# Patient Record
Sex: MALE | Race: WHITE | NOT HISPANIC OR LATINO | Employment: FULL TIME | URBAN - METROPOLITAN AREA
[De-identification: names, ages, dates, MRNs, and addresses within clinical notes are randomized per-mention and may not be internally consistent; named-entity substitution may affect disease eponyms.]

---

## 2017-10-21 ENCOUNTER — HOSPITAL ENCOUNTER (EMERGENCY)
Facility: HOSPITAL | Age: 32
Discharge: HOME | End: 2017-10-21
Attending: EMERGENCY MEDICINE | Admitting: EMERGENCY MEDICINE
Payer: COMMERCIAL

## 2017-10-21 VITALS
SYSTOLIC BLOOD PRESSURE: 131 MMHG | OXYGEN SATURATION: 99 % | BODY MASS INDEX: 24.93 KG/M2 | HEART RATE: 69 BPM | WEIGHT: 194.22 LBS | DIASTOLIC BLOOD PRESSURE: 74 MMHG | RESPIRATION RATE: 18 BRPM | TEMPERATURE: 98.1 F | HEIGHT: 74 IN
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
## 2017-10-21 PROCEDURE — 99282 EMERGENCY DEPT VISIT SF MDM: CPT

## 2017-10-21 RX ORDER — LIDOCAINE HYDROCHLORIDE 10 MG/ML
INJECTION, SOLUTION EPIDURAL; INFILTRATION; INTRACAUDAL; PERINEURAL
Status: DISCONTINUED
Start: 2017-10-21 | End: 2017-10-21 | Stop reason: HOSPADM

## 2017-10-21 RX ORDER — DOXYCYCLINE HYCLATE 100 MG/1
200 CAPSULE ORAL ONCE
Status: COMPLETED | OUTPATIENT
Start: 2017-10-21 | End: 2017-10-21

## 2017-10-21 RX ORDER — LIDOCAINE HYDROCHLORIDE 10 MG/ML
5 INJECTION, SOLUTION INFILTRATION; PERINEURAL ONCE
Status: COMPLETED | OUTPATIENT
Start: 2017-10-21 | End: 2017-10-21

## 2017-10-21 RX ADMIN — LIDOCAINE HYDROCHLORIDE 5 ML: 10 INJECTION, SOLUTION EPIDURAL; INFILTRATION; INTRACAUDAL; PERINEURAL at 08:40

## 2017-10-21 RX ADMIN — DOXYCYCLINE 200 MG: 100 CAPSULE ORAL at 08:39

## 2017-10-21 NOTE — ED PROVIDER NOTES
History  Chief Complaint   Patient presents with    Tick Bite     Reports pulling out tick around 6 am from [facility]  Reports head still remaining  Patient is a pleasant 68-year-old male who got bit by a tick likely yesterday after he went hiking  He removed the tick this morning but the head was still attached, removed the head with some lidocaine and a 11 blade scalpel  Patient tolerated the procedure well  No fevers, chills, sweats, nausea, vomiting, diarrhea  Medical decision making:  Well-appearing 68-year-old male, tick head removed, will send home  None       History reviewed  No pertinent past medical history  History reviewed  No pertinent surgical history  History reviewed  No pertinent family history  I have reviewed and agree with the history as documented  Social History   Substance Use Topics    Smoking status: Never Smoker    Smokeless tobacco: Never Used    Alcohol use No        Review of Systems   Constitutional: Negative for chills and fever  HENT: Negative for ear pain and hearing loss  Respiratory: Negative for chest tightness and shortness of breath  Cardiovascular: Negative for chest pain and leg swelling  Gastrointestinal: Negative for abdominal pain, diarrhea and nausea  Genitourinary: Negative for dysuria and hematuria  Musculoskeletal: Negative for joint swelling and neck stiffness  Skin: Negative for rash  Neurological: Negative for seizures and headaches  Psychiatric/Behavioral: Negative for hallucinations and suicidal ideas  All other systems reviewed and are negative        Physical Exam  ED Triage Vitals [10/21/17 0810]   Temperature Pulse Respirations Blood Pressure SpO2   98 1 °F (36 7 °C) 69 18 131/74 99 %      Temp Source Heart Rate Source Patient Position - Orthostatic VS BP Location FiO2 (%)   Oral Monitor Sitting Right arm --      Pain Score       No Pain           Physical Exam   Constitutional: He is oriented to person, place, and time  He appears well-developed and well-nourished  HENT:   Head: Normocephalic and atraumatic  Eyes: EOM are normal  Pupils are equal, round, and reactive to light  Neck: Normal range of motion  Neck supple  Cardiovascular: Normal rate, regular rhythm and normal heart sounds  No murmur heard  Pulmonary/Chest: Effort normal and breath sounds normal  No respiratory distress  He has no wheezes  Abdominal: Soft  Bowel sounds are normal  He exhibits no distension  There is no tenderness  Musculoskeletal: Normal range of motion  He exhibits no edema or tenderness  Neurological: He is alert and oriented to person, place, and time  No cranial nerve deficit  Coordination normal    Skin: Skin is warm and dry  He is not diaphoretic  No erythema  Psychiatric: He has a normal mood and affect  His behavior is normal    Nursing note and vitals reviewed  ED Medications  Medications   doxycycline hyclate (VIBRAMYCIN) capsule 200 mg (200 mg Oral Given 10/21/17 0839)   lidocaine (XYLOCAINE) 1 % injection 5 mL (5 mL Infiltration Given 10/21/17 0840)       Diagnostic Studies  Labs Reviewed - No data to display    No orders to display       Procedures  Procedures      Phone Contacts  ED Phone Contact    ED Course  ED Course                                MDM  CritCare Time    Disposition  Final diagnoses:   Tick bite     ED Disposition     ED Disposition Condition Comment    Discharge  Sherry Lilian discharge to home/self care  Condition at discharge: Good        Follow-up Information     Follow up With Specialties Details Why Contact Info    Rosalba Suh MD Family Medicine In 1 day  [address] [place] [address] [id number]          Patient's Medications    No medications on file     No discharge procedures on file      ED Provider  Electronically Signed by       Juan David Palmer MD  10/21/17 4321

## 2017-10-21 NOTE — DISCHARGE INSTRUCTIONS
Tick Bite   WHAT YOU NEED TO KNOW:   Most tick bites are not dangerous, but ticks can pass disease or infection when they bite  A tick will bite and then move further into the skin, where it stays to feed on blood  Ticks need to be removed quickly  Serious symptoms of a tick bite need immediate treatment  DISCHARGE INSTRUCTIONS:   Medicines:   · Antibiotics:  Healthcare providers may give you antibiotics if you get an infection from the tick bite  Do not stop taking the antibiotics until you talk to your healthcare provider, even if you feel better  · Antihistamines:  Antihistamines decrease swelling and itching  · Local anesthetic:  This medicine helps to decrease pain and itching  · Skin protectant:  Skin protectants help soothe itchy, red skin  They may also keep out infection  Some examples of these medicines are calamine and zinc oxide  · Steroids: You may need to take steroids if you have a very bad reaction to your tick bite  Take steroids with food to keep your stomach from becoming upset from the medicine  Do not stop taking this medicine until you talk to your healthcare provider  · Topical steroids:  A topical steroid is medicine that you rub into your skin to decrease redness and itching  Topical steroids are available without a doctor's order  Do not use this medicine on areas of skin that are cut, scratched, or infected  · Take your medicine as directed  Call your healthcare provider if you think your medicine is not helping or if you have side effects  Tell him if you are allergic to any medicine  Keep a list of the medicines, vitamins, and herbs you take  Include the amounts, and when and why you take them  Bring the list or the pill bottles to follow-up visits  Carry your medicine list with you in case of an emergency  Follow up with your healthcare provider as directed:  Write down your questions so you remember to ask them during your visits     How to remove a tick:  Ticks must be removed as soon as possible to help prevent them from passing disease or infection  You are less likely to get sick from a tick bite if you remove the tick within 24 hours  Do the following to remove a tick:  · Soak a cotton ball with rubbing alcohol, or use a disposable alcohol wipe  Gently clean the skin around the tick  · Grasp the tick as close to your skin as possible  Pull the tick straight up and out with tweezers, or with fingertips protected by a tissue or gloves  Do not touch the tick with your bare hands  · Pull gently until the tick lets go  Do not twist or jerk the tick suddenly, because this may break off the tick's head or mouth parts  You can buy a special V-shaped device that help lift ticks out safely  Do not leave any part of the tick in your skin  · Do not crush or squeeze the tick since its body may be infected with germs  Flush the tick down the toilet  · Do not put a hot match, petroleum jelly, or fingernail polish on the tick  This does not help and may be dangerous  · After the tick is removed, clean the area of the bite with rubbing alcohol  Then wash your hands with soap and water  Care for your tick bite:  Apply ice to the bite inocencio to help to decrease pain, itching and swelling  Put ice in a plastic bag  Cover the bag with a towel  Put the bag on your bite for 15 to 20 minutes every hour or as directed by your healthcare provider  Try not to scratch the bite  Prevent another tick bite:  Ticks live in areas covered by brush and grass  They may even be found in your lawn if you live in certain areas  Outdoor pets can carry ticks inside the house  Ticks can grab onto you or your clothes when you walk by grass or brush  If you go into areas that contain many trees, tall grasses, and underbrush, do the following:  · Wear protective clothing:  Wear pants and a long-sleeved shirt  Tuck your pants into your socks or boots  Tuck in your shirt   Wear sleeves that fit close to the skin at your wrists and neck  This will help prevent ticks from crawling through gaps in your clothing and onto your skin  Wear a hat in areas with trees  Wear light-colored clothing to make finding ticks easier  · Use insect repellant:  Put insect repellent on skin that is showing  The insect repellant should contain DEET  Do not put insect repellant on skin that is cut, scratched, or irritated  Do not put insect repellent on a child's face or hands  Always use soap and water to wash the insect repellant off as soon as possible once you are indoors  · Spray insect repellant onto your clothes:  Use permethrin spray  This spray kills ticks that crawl on your clothing  Be sure to spray the tops of your boots, bottom of pant legs, and sleeve cuffs  As soon as possible, wash and dry clothing that has been worn outdoors  · Check for ticks often:  Check your clothing, hair, and skin for ticks every 2 to 3 hours while you are outdoors  Carefully check the hairline, armpits, neck, and waist  Check your pets and children for ticks  Remove ticks from pets the same way as you remove them from people  · Decrease the risk of ticks in your yard:  Ticks like to live in [place], moist areas  Daralene Shar your lawn regularly to keep the grass short  Trim the grass around birdbaths and fences  Cut branches that are overgrown and take them out of the yard  Clear out leaf piles  Evalee Living firewood in a dry, kayla area  Contact your healthcare provider if:   · You cannot remove the tick  · The tick's head is stuck in the skin  · You have questions or concerns about your condition or care  Seek care immediately or call 911 if:   · You get a fever, rash, headache, or muscle or joint pains within 1 month of a tick bite  These may be signs of a more serious disease  · You are having trouble walking or moving your legs    © 2016 [address] is for End User's use only and may not be sold, redistributed or otherwise used for commercial purposes  All illustrations and images included in CareNotes® are the copyrighted property of [employer]  or [employer] Ron  The above information is an  only  It is not intended as medical advice for individual conditions or treatments  Talk to your doctor, nurse or pharmacist before following any medical regimen to see if it is safe and effective for you

## 2017-12-07 ENCOUNTER — HOSPITAL ENCOUNTER (OUTPATIENT)
Dept: RADIOLOGY | Facility: HOSPITAL | Age: 32
Discharge: HOME | End: 2017-12-07
Payer: COMMERCIAL

## 2017-12-07 ENCOUNTER — ALLSCRIPTS OFFICE VISIT (OUTPATIENT)
Dept: OTHER | Facility: OTHER | Age: 32
End: 2017-12-07

## 2017-12-07 DIAGNOSIS — J18.9 PNEUMONIA: ICD-10-CM

## 2017-12-07 PROCEDURE — 71020 HB CHEST X-RAY 2VW FRONTAL&LATL: CPT

## 2017-12-08 NOTE — PROGRESS NOTES
Assessment    1  CAP (community acquired pneumonia) (5) (J18 9)   2  Allergic rhinitis (477 9) (J30 9)   3  Cough due to bronchospasm (519 11) (J98 01)    Plan  CAP (community acquired pneumonia)    · Azithromycin 250 MG Oral Tablet; TAKE AS DIRECTED PER PACKAGEINSTRUCTIONS   · MethylPREDNISolone 4 MG Oral Tablet Therapy Pack; use as directed   · ProAir  (90 Base) MCG/ACT Inhalation Aerosol Solution; INHALE 2 PUFFSEVERY 4-6 HOURS AS NEEDED   · * XR CHEST PA & LATERAL; Status:Active; Requested for:[id number];     Discussion/Summary    In summary then this is a 50-year-old male whose had 5 weeks of persistent cough  He had a similar event 2 years ago  He has had no interval symptoms  He has no diagnosis of asthma  On examination today does have evidence of allergic rhinitis  He also appears to have focal findings of crackles/consolidation of his left posterior mid lung field  He also has significant bronchospasm  We are going to start him on azithromycin Z-Cedrick, a Medrol Dosepak and also given a albuterol inhaler to use  He is asked to go for chest x-ray to evaluate for pneumonia  In the meantime he is asked push fluids and rest and will follow up with him when results are available  He agrees  He is in no acute respiratory distress presently  Chief Complaint  I have had a cough for 5 weeks  Worse with exertion  Pleuritic exertion  Increased fatigue and night sweats  No sputum  Mild wheeze which seems to improve in the cold air  Coughs to  light headed   No diagnosis of asthma  No fever  Co workers ill with same as well as son  He was seen in urgent care center when it began and he had chest x-ray which she was told was normal       History of Present Illness  HPI: The patient is a 50-year-old male who states that he has had a cough for 5 weeks that while it seemed to improve  He has some anterior pleuritic chest pain  He has had increased fatigue and night sweats   He has had no sputum but he does admit to some mild shortness of breath which improves when he is out cold air  Sometimes he coughs only feels lightheaded  He has had no fever that he is aware of  Some of his coworkers and son have had upper respiratory infections  He carries no diagnosis of asthma  He has had no PND orthopnea exertional chest pain X cetera  Pneumonia, Community Acquired (Brief): The patient is being seen for an initial evaluation of community acquired pneumonia  Symptoms:  dry cough,-- dyspnea,-- chills,-- chest pain-- and-- fatigue, but-- no hemoptysis,-- no wheezing,-- no rapid respirations-- and-- no headache  The patient is currently experiencing symptoms  Associated symptoms:  nasal congestion-- and-- lightheadedness, but-- no rigors,-- no anorexia-- and-- no nausea  Review of Systems   Constitutional: no recent weight gain-- and-- no recent weight loss  ENT: nasal discharge  Cardiovascular: chest pain-- and-- Pleuritic anterior chest pain, but-- no lower extremity edema  Respiratory: shortness of breath-- and-- cough, but-- no orthopnea,-- no wheezing-- and-- no PND  Neurological: dizziness, but-- as noted in HPI-- and-- no headache  Family History  Father    1  Family history of Cancer (199 1) (C80 1)    Social History   · Never a smoker  The social history was reviewed and updated today  The social history was reviewed and is unchanged  Current Meds   1  No Reported Medications Recorded    Allergies  1  No Known Drug Allergies    Vitals   Recorded: [id number] 06:46PM   Temperature 36 2 F   Systolic 869   Diastolic 78   Weight 218 lb    BMI Calculated 25 51   BSA Calculated 2 14       Physical Exam   Constitutional  General appearance: No acute distress, well appearing and well nourished  -- Alert and oriented and in no apparent distress  Ears, Nose, Mouth, and Throat  Otoscopic examination: Tympanic membrance translucent with normal light reflex  Canals patent without erythema     Nasal mucosa, septum, and turbinates: Abnormal  -- Boggy and very pale blue  Oropharynx: Normal with no erythema, edema, exudate or lesions  -- No ulcer exudate or lesion  Pulmonary  Respiratory effort: Abnormal  -- Frequent bronchospastic cough  Auscultation of lungs: Abnormal  -- Mild to moderate expiratory delay with expiratory wheezing and he does have some left mid lower lung crackles  Cardiovascular  Auscultation of heart: Normal rate and rhythm, normal S1 and S2, without murmurs  -- Regular rhythm with a rate in the 80s  Examination of extremities for edema and/or varicosities: Normal  -- No edema  Lymphatic  Palpation of lymph nodes in neck: No lymphadenopathy  -- No JVD or adenopathy  Musculoskeletal  Digits and nails: Normal without clubbing or cyanosis  -- No clubbing or cyanosis    Psychiatric  Orientation to person, place and time: Normal    Mood and affect: Normal          Signatures   Electronically signed by : YVONNE Casanova ; Dec  7 2017  7:30PM EST                       (Author)

## 2017-12-12 ENCOUNTER — GENERIC CONVERSION - ENCOUNTER (OUTPATIENT)
Dept: OTHER | Facility: OTHER | Age: 32
End: 2017-12-12

## 2018-01-12 NOTE — PROGRESS NOTES
Assessment    1  Encounter for preventive health examination (V70 0) (Z00 00)    Plan  Health Maintenance    · Follow-up visit in 1 year Evaluation and Treatment  Follow-up  Status: Hold For -  Scheduling  Requested for: [id number]   · Begin or continue regular aerobic exercise  Gradually work up to at least 3 sessions of 30  minutes of exercise a week ; Status:Complete;   Done: [id number]   · Eat a normal well-balanced diet ; Status:Complete;   Done: [id number]   · Use a sun block product with an SPF of 15 or more ; Status:Complete;   Done:  [id number]    Discussion/Summary  Impression: health maintenance visit, healthy adult male  Currently, he eats an adequate diet and has an adequate exercise regimen  Prostate cancer screening: PSA is not indicated  Testicular cancer screening: clinical testicular exam was done today  Colorectal cancer screening: the risks and benefits of colorectal cancer screening were discussed, colorectal cancer screening is not indicated and He will begin colon cancer screening at age 35 as his father was diagnosed at age 46  Screening lab work includes lipid profile  Advice and education were given regarding nutrition and aerobic exercise  Patient discussion: discussed with the patient  The patient is a healthy 69-year-old male  He has yet to have his fasting blood work performed and we again asked to do this prior to his next examination  His weight, exercise pattern as well as diet are all excellent  No smoking and no illicit drugs  Social alcohol  Recheck in one year or sooner as needed  History of Present Illness  HM, Adult Male: The patient is being seen for a health maintenance evaluation  The last health maintenance visit was 1 year(s) ago  Social History: Household members include spouse, 1 age 3 daughter(s) and 1 age 3 son(s)  He is   Work status: working full time  The patient has never smoked cigarettes  He reports occasional alcohol use   He has never used illicit drugs  General Health: The patient's health since the last visit is described as good  He has regular dental visits  He denies vision problems  He denies hearing loss  Lifestyle:  He consumes a diverse and healthy diet  He does not have any weight concerns  He exercises regularly  He does not use tobacco  He consumes alcohol  He denies drug use  Reproductive health:  the patient is sexually active  He denies erectile dysfunction  Screening: cancer screening reviewed and current  metabolic screening reviewed and updated  Additional History:  He did not get fasting blood work performed last year  We reprinted requisitions for CBC CMP and cholesterol  Family History  Father    · Family history of Cancer (199 1) (C80 1)    Social History    · Never a smoker    Current Meds   1  No Reported Medications Recorded    Allergies    1  No Known Drug Allergies    Vitals   Recorded: [id number] 14:51MU   Systolic 662, LUE, Sitting   Diastolic 70, LUE, Sitting   Temperature 98 F   Height 6 ft 1 5 in   Weight 186 lb    BMI Calculated 24 21   BSA Calculated 2 09     Physical Exam    Constitutional   General appearance: No acute distress, well appearing and well nourished  Head and Face   Head and face: Normal     Eyes   Conjunctiva and lids: No erythema, swelling or discharge  Ears, Nose, Mouth, and Throat   Otoscopic examination: Tympanic membranes translucent with normal light reflex  Canals patent without erythema  Lips, teeth, and gums: Normal, good dentition  Oropharynx: Normal with no erythema, edema, exudate or lesions  Neck   Neck: Supple, symmetric, trachea midline, no masses  Thyroid: Normal, no thyromegaly  Pulmonary   Respiratory effort: No increased work of breathing or signs of respiratory distress  Auscultation of lungs: Clear to auscultation  Cardiovascular   Auscultation of heart: Normal rate and rhythm, normal S1 and S2, no murmurs    The heart rate was normal  The rhythm was regular  Heart sounds: normal S1, normal S2 and no gallop heard  no murmurs were heard  Carotid pulses: 2+ bilaterally  Examination of extremities for edema and/or varicosities: Normal     Abdomen   Abdomen: Non-tender, no masses  Liver and spleen: No hepatomegaly or splenomegaly  Examination for hernias: No hernias appreciated  Genitourinary   Scrotal contents: Normal testes, no masses  Penis: Normal, no lesions  Lymphatic   Palpation of lymph nodes in neck: No lymphadenopathy      Psychiatric   Judgment and insight: Normal     Orientation to person, place and time: Normal     Mood and affect: Normal        Signatures   Electronically signed by : YVONNE Marie ; Oct 14 2016  2:29PM EST                       (Author)

## 2018-01-23 VITALS
SYSTOLIC BLOOD PRESSURE: 110 MMHG | TEMPERATURE: 97.3 F | DIASTOLIC BLOOD PRESSURE: 78 MMHG | WEIGHT: 196 LBS | BODY MASS INDEX: 25.16 KG/M2

## 2018-01-23 NOTE — RESULT NOTES
Verified Results  * XR CHEST PA & LATERAL [id number] 08:12PM Moris Gramajo Order Number: [id number]     Test Name Result Flag Reference   XR CHEST PA & LATERAL (Report)     CHEST - DUAL ENERGY     INDICATION: J18 9: Pneumonia, unspecified organism  History taken directly from the electronic ordering system  COMPARISON: None     VIEWS: PA (including soft tissue/bone algorithms) and lateral projections     IMAGES: 4     FINDINGS:        Cardiomediastinal silhouette appears unremarkable  The lungs are clear  No pneumothorax or pleural effusion  Visualized osseous structures appear within normal limits for the patient's age  IMPRESSION:     No active pulmonary disease         Workstation performed: [id number]     Signed by:   Anna Covarrubias MD   12/12/17

## 2018-03-26 ENCOUNTER — HOSPITAL ENCOUNTER (OUTPATIENT)
Facility: HOSPITAL | Age: 33
Setting detail: OBSERVATION
Discharge: HOME | End: 2018-03-27
Attending: EMERGENCY MEDICINE | Admitting: SURGERY
Payer: COMMERCIAL

## 2018-03-26 ENCOUNTER — APPOINTMENT (EMERGENCY)
Dept: RADIOLOGY | Facility: HOSPITAL | Age: 33
End: 2018-03-26
Payer: COMMERCIAL

## 2018-03-26 ENCOUNTER — APPOINTMENT (OUTPATIENT)
Dept: RADIOLOGY | Facility: HOSPITAL | Age: 33
End: 2018-03-26
Payer: COMMERCIAL

## 2018-03-26 DIAGNOSIS — S22.32XA TRAUMATIC CLOSED DISPLACED FRACTURE OF RIB, LEFT, INITIAL ENCOUNTER: Primary | ICD-10-CM

## 2018-03-26 DIAGNOSIS — T50.8X5A ADVERSE EFFECT OF CONTRAST MEDIA, INITIAL ENCOUNTER: ICD-10-CM

## 2018-03-26 DIAGNOSIS — J93.9 PNEUMOTHORAX, LEFT: ICD-10-CM

## 2018-03-26 DIAGNOSIS — S22.31XA RIGHT RIB FRACTURE: ICD-10-CM

## 2018-03-26 PROBLEM — S27.0XXA TRAUMATIC PNEUMOTHORAX: Status: ACTIVE | Noted: 2018-03-26
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
## 2018-03-26 LAB
ANION GAP BLD CALC-SCNC: 14 MMOL/L (ref 4–13)
BASOPHILS # BLD AUTO: 0.03 THOUSANDS/ΜL (ref 0–0.1)
BASOPHILS NFR BLD AUTO: 0 % (ref 0–1)
BUN BLD-MCNC: 13 MG/DL (ref 5–25)
CA-I BLD-SCNC: 1.2 MMOL/L (ref 1.12–1.32)
CHLORIDE BLD-SCNC: 100 MMOL/L (ref 100–108)
CREAT BLD-MCNC: 1.1 MG/DL (ref 0.6–1.3)
EOSINOPHIL # BLD AUTO: 0.08 THOUSAND/ΜL (ref 0–0.61)
EOSINOPHIL NFR BLD AUTO: 1 % (ref 0–6)
ERYTHROCYTE [DISTWIDTH] IN BLOOD BY AUTOMATED COUNT: 12.7 % (ref 11.6–15.1)
GFR SERPL CREATININE-BSD FRML MDRD: 87 ML/MIN/1.73SQ M
GLUCOSE SERPL-MCNC: 96 MG/DL (ref 65–140)
HCT VFR BLD AUTO: 44.4 % (ref 36.5–49.3)
HCT VFR BLD CALC: 46 % (ref 36.5–49.3)
HGB BLD-MCNC: 15.8 G/DL (ref 12–17)
HGB BLDA-MCNC: 15.6 G/DL (ref 12–17)
LYMPHOCYTES # BLD AUTO: 2.52 THOUSANDS/ΜL (ref 0.6–4.47)
LYMPHOCYTES NFR BLD AUTO: 37 % (ref 14–44)
MCH RBC QN AUTO: 30.5 PG (ref 26.8–34.3)
MCHC RBC AUTO-ENTMCNC: 35.6 G/DL (ref 31.4–37.4)
MCV RBC AUTO: 86 FL (ref 82–98)
MONOCYTES # BLD AUTO: 0.55 THOUSAND/ΜL (ref 0.17–1.22)
MONOCYTES NFR BLD AUTO: 8 % (ref 4–12)
NEUTROPHILS # BLD AUTO: 3.68 THOUSANDS/ΜL (ref 1.85–7.62)
NEUTS SEG NFR BLD AUTO: 54 % (ref 43–75)
NRBC BLD AUTO-RTO: 0 /100 WBCS
PCO2 BLD: 31 MMOL/L (ref 21–32)
PLATELET # BLD AUTO: 262 THOUSANDS/UL (ref 149–390)
PMV BLD AUTO: 9.5 FL (ref 8.9–12.7)
POTASSIUM BLD-SCNC: 4 MMOL/L (ref 3.5–5.3)
RBC # BLD AUTO: 5.18 MILLION/UL (ref 3.88–5.62)
SODIUM BLD-SCNC: 140 MMOL/L (ref 136–145)
SPECIMEN SOURCE: ABNORMAL
WBC # BLD AUTO: 6.88 THOUSAND/UL (ref 4.31–10.16)

## 2018-03-26 PROCEDURE — 74177 CT ABD & PELVIS W/CONTRAST: CPT

## 2018-03-26 PROCEDURE — 99219 PR INITIAL OBSERVATION CARE/DAY 50 MINUTES: CPT | Performed by: SURGERY

## 2018-03-26 PROCEDURE — 99285 EMERGENCY DEPT VISIT HI MDM: CPT

## 2018-03-26 PROCEDURE — 36415 COLL VENOUS BLD VENIPUNCTURE: CPT | Performed by: PHYSICIAN ASSISTANT

## 2018-03-26 PROCEDURE — 71046 X-RAY EXAM CHEST 2 VIEWS: CPT

## 2018-03-26 PROCEDURE — 80047 BASIC METABLC PNL IONIZED CA: CPT

## 2018-03-26 PROCEDURE — 96375 TX/PRO/DX INJ NEW DRUG ADDON: CPT

## 2018-03-26 PROCEDURE — 71250 CT THORAX DX C-: CPT

## 2018-03-26 PROCEDURE — 96361 HYDRATE IV INFUSION ADD-ON: CPT

## 2018-03-26 PROCEDURE — 72125 CT NECK SPINE W/O DYE: CPT

## 2018-03-26 PROCEDURE — 85014 HEMATOCRIT: CPT

## 2018-03-26 PROCEDURE — 70450 CT HEAD/BRAIN W/O DYE: CPT

## 2018-03-26 PROCEDURE — 85025 COMPLETE CBC W/AUTO DIFF WBC: CPT | Performed by: PHYSICIAN ASSISTANT

## 2018-03-26 PROCEDURE — 96374 THER/PROPH/DIAG INJ IV PUSH: CPT

## 2018-03-26 PROCEDURE — 73030 X-RAY EXAM OF SHOULDER: CPT

## 2018-03-26 PROCEDURE — 71260 CT THORAX DX C+: CPT

## 2018-03-26 RX ORDER — METHOCARBAMOL 500 MG/1
500 TABLET, FILM COATED ORAL EVERY 6 HOURS SCHEDULED
Status: DISCONTINUED | OUTPATIENT
Start: 2018-03-26 | End: 2018-03-26

## 2018-03-26 RX ORDER — METHOCARBAMOL 750 MG/1
750 TABLET, FILM COATED ORAL EVERY 6 HOURS SCHEDULED
Status: DISCONTINUED | OUTPATIENT
Start: 2018-03-26 | End: 2018-03-27 | Stop reason: HOSPADM

## 2018-03-26 RX ORDER — ONDANSETRON 2 MG/ML
4 INJECTION INTRAMUSCULAR; INTRAVENOUS ONCE
Status: COMPLETED | OUTPATIENT
Start: 2018-03-26 | End: 2018-03-26

## 2018-03-26 RX ORDER — OXYCODONE HYDROCHLORIDE 5 MG/1
2.5 TABLET ORAL EVERY 4 HOURS PRN
Status: DISCONTINUED | OUTPATIENT
Start: 2018-03-26 | End: 2018-03-27 | Stop reason: HOSPADM

## 2018-03-26 RX ORDER — LIDOCAINE 50 MG/G
1 PATCH TOPICAL DAILY
Status: DISCONTINUED | OUTPATIENT
Start: 2018-03-26 | End: 2018-03-27 | Stop reason: HOSPADM

## 2018-03-26 RX ORDER — OXYCODONE HYDROCHLORIDE 5 MG/1
5 TABLET ORAL EVERY 4 HOURS PRN
Status: DISCONTINUED | OUTPATIENT
Start: 2018-03-26 | End: 2018-03-26

## 2018-03-26 RX ORDER — OXYCODONE HYDROCHLORIDE 10 MG/1
10 TABLET ORAL EVERY 4 HOURS PRN
Status: DISCONTINUED | OUTPATIENT
Start: 2018-03-26 | End: 2018-03-26

## 2018-03-26 RX ORDER — FAMOTIDINE 20 MG/1
20 TABLET, FILM COATED ORAL ONCE
Status: COMPLETED | OUTPATIENT
Start: 2018-03-26 | End: 2018-03-26

## 2018-03-26 RX ORDER — METHYLPREDNISOLONE SODIUM SUCCINATE 125 MG/2ML
125 INJECTION, POWDER, LYOPHILIZED, FOR SOLUTION INTRAMUSCULAR; INTRAVENOUS ONCE
Status: COMPLETED | OUTPATIENT
Start: 2018-03-26 | End: 2018-03-26

## 2018-03-26 RX ORDER — ACETAMINOPHEN 325 MG/1
975 TABLET ORAL EVERY 8 HOURS SCHEDULED
Status: DISCONTINUED | OUTPATIENT
Start: 2018-03-26 | End: 2018-03-27 | Stop reason: HOSPADM

## 2018-03-26 RX ORDER — MORPHINE SULFATE 2 MG/ML
2 INJECTION, SOLUTION INTRAMUSCULAR; INTRAVENOUS ONCE
Status: COMPLETED | OUTPATIENT
Start: 2018-03-26 | End: 2018-03-26

## 2018-03-26 RX ORDER — OXYCODONE HYDROCHLORIDE 5 MG/1
5 TABLET ORAL EVERY 4 HOURS PRN
Status: DISCONTINUED | OUTPATIENT
Start: 2018-03-26 | End: 2018-03-27 | Stop reason: HOSPADM

## 2018-03-26 RX ORDER — DIPHENHYDRAMINE HYDROCHLORIDE 50 MG/ML
25 INJECTION INTRAMUSCULAR; INTRAVENOUS ONCE
Status: COMPLETED | OUTPATIENT
Start: 2018-03-26 | End: 2018-03-26

## 2018-03-26 RX ADMIN — METHOCARBAMOL 750 MG: 750 TABLET ORAL at 17:01

## 2018-03-26 RX ADMIN — OXYCODONE HYDROCHLORIDE 5 MG: 5 TABLET ORAL at 14:45

## 2018-03-26 RX ADMIN — IOHEXOL 100 ML: 350 INJECTION, SOLUTION INTRAVENOUS at 13:08

## 2018-03-26 RX ADMIN — OXYCODONE HYDROCHLORIDE 5 MG: 5 TABLET ORAL at 23:27

## 2018-03-26 RX ADMIN — ONDANSETRON 4 MG: 2 INJECTION INTRAMUSCULAR; INTRAVENOUS at 12:07

## 2018-03-26 RX ADMIN — DIPHENHYDRAMINE HYDROCHLORIDE 25 MG: 50 INJECTION, SOLUTION INTRAMUSCULAR; INTRAVENOUS at 14:46

## 2018-03-26 RX ADMIN — METHOCARBAMOL 750 MG: 750 TABLET ORAL at 23:27

## 2018-03-26 RX ADMIN — ACETAMINOPHEN 975 MG: 325 TABLET, FILM COATED ORAL at 14:44

## 2018-03-26 RX ADMIN — FAMOTIDINE 20 MG: 20 TABLET, FILM COATED ORAL at 14:45

## 2018-03-26 RX ADMIN — METHOCARBAMOL 500 MG: 500 TABLET ORAL at 14:44

## 2018-03-26 RX ADMIN — METHYLPREDNISOLONE SODIUM SUCCINATE 125 MG: 125 INJECTION, POWDER, FOR SOLUTION INTRAMUSCULAR; INTRAVENOUS at 14:42

## 2018-03-26 RX ADMIN — OXYCODONE HYDROCHLORIDE 2.5 MG: 5 TABLET ORAL at 14:55

## 2018-03-26 RX ADMIN — LIDOCAINE 1 PATCH: 50 PATCH TOPICAL at 14:48

## 2018-03-26 RX ADMIN — ACETAMINOPHEN 975 MG: 325 TABLET, FILM COATED ORAL at 23:27

## 2018-03-26 RX ADMIN — SODIUM CHLORIDE 1000 ML: 0.9 INJECTION, SOLUTION INTRAVENOUS at 12:01

## 2018-03-26 RX ADMIN — MORPHINE SULFATE 2 MG: 2 INJECTION, SOLUTION INTRAMUSCULAR; INTRAVENOUS at 12:06

## 2018-03-26 RX ADMIN — OXYCODONE HYDROCHLORIDE 5 MG: 5 TABLET ORAL at 18:57

## 2018-03-26 NOTE — Clinical Note
Case was discussed with dr Kasey Blevins and the patient's admission status was agreed to be Admission Status: observation status to the service of Dr Kasey Blevins

## 2018-03-26 NOTE — ED PROVIDER NOTES
History  Chief Complaint   Patient presents with    Shoulder Injury      [place] with kids yesterday and hurt left shoulder  Decreased movement     This is a 28year-old who was on a tire swing yesterday with his children went up an air fell off landing on his chest   He does not have shoulder or clavicle pain  He is complaining of left anterior chest wall pain especially when he takes a deep breath  He is not short of breath  The pain is reproducible  No headache no blurred vision or double vision no cough congestion or sore throat  No nausea vomiting diarrhea abdominal pain this pain is made worse with deep breath or movement  He has tried nothing over-the-counter for the pain  At this time he will have a CT of his chest to rule out rib fracture, pneumothorax, sternal fracture  Once again he has no pain over the shoulder or clavicle  None       History reviewed  No pertinent past medical history  History reviewed  No pertinent surgical history  History reviewed  No pertinent family history  I have reviewed and agree with the history as documented  Social History   Substance Use Topics    Smoking status: Never Smoker    Smokeless tobacco: Never Used    Alcohol use Yes        Review of Systems   All other systems reviewed and are negative        Physical Exam  ED Triage Vitals   Temperature Pulse Respirations Blood Pressure SpO2   03/26/18 1014 03/26/18 1014 03/26/18 1014 03/26/18 1014 03/26/18 1014   97 8 °F (36 6 °C) 64 20 135/75 100 %      Temp Source Heart Rate Source Patient Position - Orthostatic VS BP Location FiO2 (%)   03/26/18 1014 03/26/18 1014 03/26/18 1525 03/26/18 1525 --   Tympanic Monitor Sitting Right arm       Pain Score       03/26/18 1014       5           Orthostatic Vital Signs  Vitals:    03/26/18 1014 03/26/18 1525 03/26/18 2327   BP: 135/75 127/79 106/59   Pulse: 64 57 75   Patient Position - Orthostatic VS:  Sitting Lying       Physical Exam   Constitutional: He appears well-developed and well-nourished  HENT:   Head: Normocephalic and atraumatic  Right Ear: External ear normal    Left Ear: External ear normal    Nose: Nose normal    Mouth/Throat: Oropharynx is clear and moist    Eyes: Conjunctivae are normal  Pupils are equal, round, and reactive to light  Neck: Normal range of motion  Neck supple  Cardiovascular: Normal rate and regular rhythm  Pulmonary/Chest: Effort normal and breath sounds normal        Abdominal: Soft  Bowel sounds are normal  There is no tenderness  Musculoskeletal: Normal range of motion  Neurological: He is alert  Skin: Skin is warm  Psychiatric: He has a normal mood and affect  His behavior is normal    Nursing note and vitals reviewed        ED Medications  Medications   acetaminophen (TYLENOL) tablet 975 mg (975 mg Oral Given 3/26/18 2327)   lidocaine (LIDODERM) 5 % patch 1 patch (1 patch Transdermal Patch Removed 3/27/18 0249)   enoxaparin (LOVENOX) subcutaneous injection 40 mg (40 mg Subcutaneous Not Given 3/26/18 1430)   oxyCODONE (ROXICODONE) IR tablet 5 mg (5 mg Oral Given 3/26/18 2327)   oxyCODONE (ROXICODONE) IR tablet 2 5 mg (2 5 mg Oral Given 3/26/18 1455)   methocarbamol (ROBAXIN) tablet 750 mg (750 mg Oral Given 3/26/18 2327)   sodium chloride 0 9 % bolus 1,000 mL (0 mL Intravenous Stopped 3/26/18 1301)   ondansetron (ZOFRAN) injection 4 mg (4 mg Intravenous Given 3/26/18 1207)   morphine injection 2 mg (2 mg Intravenous Given 3/26/18 1206)   iohexol (OMNIPAQUE) 350 MG/ML injection (MULTI-DOSE) 100 mL (100 mL Intravenous Given 3/26/18 1308)   diphenhydrAMINE (BENADRYL) injection 25 mg (25 mg Intravenous Given 3/26/18 1446)   methylPREDNISolone sodium succinate (Solu-MEDROL) injection 125 mg (125 mg Intravenous Given 3/26/18 1442)   famotidine (PEPCID) tablet 20 mg (20 mg Oral Given 3/26/18 1445)       Diagnostic Studies  Results Reviewed     Procedure Component Value Units Date/Time    Basic metabolic panel [[id number]] Collected:  03/27/18 0458    Lab Status:  No result Specimen:  Blood from Arm, Left     CBC [[id number]] Collected:  03/27/18 0458    Lab Status:  No result Specimen:  Blood from Arm, Left     CBC and differential [[id number]]  (Normal) Collected:  03/26/18 1209    Lab Status:  Final result Specimen:  Blood from Arm, Left Updated:  03/26/18 1244     WBC 6 88 Thousand/uL      RBC 5 18 Million/uL      Hemoglobin 15 8 g/dL      Hematocrit 44 4 %      MCV 86 fL      MCH 30 5 pg      MCHC 35 6 g/dL      RDW 12 7 %      MPV 9 5 fL      Platelets 051 Thousands/uL      nRBC 0 /100 WBCs      Neutrophils Relative 54 %      Lymphocytes Relative 37 %      Monocytes Relative 8 %      Eosinophils Relative 1 %      Basophils Relative 0 %      Neutrophils Absolute 3 68 Thousands/µL      Lymphocytes Absolute 2 52 Thousands/µL      Monocytes Absolute 0 55 Thousand/µL      Eosinophils Absolute 0 08 Thousand/µL      Basophils Absolute 0 03 Thousands/µL     POCT Chem 8+ [[id number]]  (Abnormal) Collected:  03/26/18 1218    Lab Status:  Final result Updated:  03/26/18 1223     SODIUM, I-STAT 140 mmol/l      Potassium, i-STAT 4 0 mmol/L      Chloride, istat 100 mmol/L      CO2, i-STAT 31 mmol/L      Anion Gap, Istat 14 (H) mmol/L      Calcium, Ionized i-STAT 1 20 mmol/L      BUN, I-STAT 13 mg/dl      Creatinine, i-STAT 1 1 mg/dl      eGFR 87 ml/min/1 73sq m      Glucose, i-STAT 96 mg/dl      Hct, i-STAT 46 %      Hgb, i-STAT 15 6 g/dl      Specimen Type VENOUS                 CT chest abdomen pelvis w contrast   Final Result by Yasmin Murray DO (03/26 1645)      Stable miniscule left apical pneumothorax  Mildly distracted fracture left anterior 1st rib again seen with questionable right 1st rib fracture of the anterior costochondral junction  No evidence of traumatic injury identified within the abdomen or pelvis           Workstation performed: [id number]         CT cervical spine without contrast   Final Result by Yasmin Murray  (03/26 1328)      No cervical spine fracture or traumatic malalignment  Tiny left apical pneumothorax  Workstation performed: [id number]         CT head without contrast   Final Result by Henry Calle DO (03/26 1322)      No acute intracranial abnormality  Workstation performed: [id number]         CT chest without contrast   Final Result by Deshawn Steel MD (03/26 1122)   Addendum 1 of 1 by Deshawn Steel MD (03/26 1149)   Addendum: Impression section should note that there are bilateral 1st rib    fractures  Final   Bilateral left 1st rib fractures with associated soft tissue emphysema on the left suggesting penetrating trauma  Tiny left apical pneumothorax best appreciated on coronal images, series 601 image 66  No evidence of right-sided pneumothorax  The study was marked in [facility] for immediate notification  Workstation performed: [id number]         XR shoulder 2+ vw left    (Results Pending)   XR chest pa & lateral    (Results Pending)              Procedures  Procedures       Phone Contacts  ED Phone Contact    ED Course  ED Course as of Mar 27 0600   Mon Mar 26, 2018   1154 Spoke with Dr Jazmin Erwin trauma explained that he has bilateral 1st rib fractures with an apical pneumothorax on the left  Patient will now have trauma scan with contrast and will be admitted for trauma observation    80 Spoke with trauma resident who will be down to admit patient    12 Was advised by the trauma resident the patient has a mild rash on him no difficulty breathing  I entered the room he does have a mild red blanchable rash on his abdomen no difficulty breathing  This occurred just after completion of the CT scans with contrast   He was given Benadryl, Pepcid, Solu-Medrol my concern is that he had a reaction to the contrast dye because it was the last thing given before the rash                                  MDM  CritCare Time    Disposition  Final diagnoses:   Traumatic closed displaced fracture of rib, left, initial encounter   Right rib fracture   Pneumothorax, left   Adverse effect of contrast media, initial encounter     Time reflects when diagnosis was documented in both MDM as applicable and the Disposition within this note     Time User Action Codes Description Comment    3/26/2018  2:03 PM [place], [address] Traumatic closed displaced fracture of rib, left, initial encounter     3/26/2018  2:03 PM [place], [address] Right rib fracture     3/26/2018  2:04 PM [place], [facility] [J93 9] Pneumothorax, left     3/26/2018  2:44 PM [place], [address] [facility] [T50 8X5A] Adverse effect of contrast media, initial encounter       ED Disposition     ED Disposition Condition Comment    Admit  Admitting Physician: Niki Villatoro   Level of Care: Med Surg [16]   Bed Type: Trauma [7]        Follow-up Information    None       There are no discharge medications for this patient  No discharge procedures on file      ED Provider  Electronically Signed by           Yvon Tamayo PA-C  03/27/18 0600

## 2018-03-26 NOTE — H&P
H&P Exam - Trauma   Reilly Wilkerson 28 y o  male MRN: [id number]  Unit/Bed#Tamar Model Encounter: [id number]    Assessment/Plan   Trauma Alert: Evaluation  Model of Arrival: Helicopter  Trauma Team: Attending Moe Stone, Residents [facility] and SHYAM Schilling  Consultants: None    Trauma Active Problems:  Bilateral rib fractures and a left apical pneumothorax    Trauma Plan:   Admit to hospital for observation  Pain control  Will consult APS if necessary  Incentive spirometry and aggressive pulmonary toilet  Repeat chest x-ray in 6 hours  Continuous oxygen saturation monitoring  Chief Complaint: L shoulder pain    History of Present Illness   HPI:  Jackie De Jesus is a 28 y o  male who presents status post fall from tire swing yesterday complaining of left shoulder pain  Patient notes falling on his left shoulder trying to break his fall, and immediately hearing and feeling a crack  He did not hit his head or lose consciousness  He does not complain of any other pain or injuries at this time  He has no history of left shoulder pain or injury  He does note that there is some pain upon inspiration and after  Mechanism:Fall    Review of Systems   Constitutional: Positive for activity change  Negative for chills and fever  HENT: Negative  Eyes: Negative  Respiratory: Positive for chest tightness  Negative for shortness of breath  Cardiovascular: Positive for chest pain  Gastrointestinal: Negative  Endocrine: Negative  Genitourinary: Negative  Musculoskeletal: Positive for arthralgias and myalgias  Allergic/Immunologic: Negative  Neurological: Negative  Hematological: Negative  Psychiatric/Behavioral: Negative  History reviewed  No pertinent past medical history  History reviewed  No pertinent surgical history    Social History   History   Alcohol Use    Yes     History   Drug Use No     History   Smoking Status    Never Smoker   Smokeless Tobacco    Never Used       There is no immunization history on file for this patient  Family History: Non-contributory    Meds/Allergies   all current active meds have been reviewed    No Known Allergies      PHYSICAL EXAM    Objective   Vitals:   First set: Temperature: 97 8 °F (36 6 °C) (03/26/18 1014)  Pulse: 64 (03/26/18 1014)  Respirations: 20 (03/26/18 1014)  Blood Pressure: 135/75 (03/26/18 1014)    Primary Survey:   (A) Airway:  Intact  (B) Breathing:  Breath sounds bilaterally  (C) Circulation: Pulses:   normal  (D) Disabliity:  GCS Total:  15  (E) Expose:  Completed    Secondary Survey:  Physical Exam   Constitutional: He is oriented to person, place, and time  He appears well-developed  He appears distressed  HENT:   Head: Normocephalic and atraumatic  Eyes: Conjunctivae are normal    Neck: Normal range of motion  Cardiovascular: Normal rate and regular rhythm  Pulmonary/Chest: Effort normal and breath sounds normal  No respiratory distress  He has no wheezes  Warmth and redness seen over anterior aspect of left chest that is new onset last hour  Abdominal: Soft  He exhibits no distension  There is no tenderness  Musculoskeletal: Normal range of motion  Pain upon passive full range of motion  Pain mostly as bringing shoulder down from forward elevation  Neurological: He is alert and oriented to person, place, and time  Skin: He is not diaphoretic  Redness seen over anterior aspect of left chest as well as left upper extremity proximal to elbow         Invasive Devices     Peripheral Intravenous Line            Peripheral IV 03/26/18 Right Antecubital less than 1 day                Lab Results:   BMP/CMP:   Lab Results   Component Value Date    GLUCOSE 96 03/26/2018    EGFR 87 03/26/2018    and CBC:   Lab Results   Component Value Date    WBC 6 88 03/26/2018    HGB 15 6 03/26/2018    HCT 44 4 03/26/2018    MCV 86 03/26/2018     03/26/2018    MCH 30 5 03/26/2018    MCHC 35 6 03/26/2018    RDW 12 7 03/26/2018    MPV 9 5 03/26/2018    NRBC 0 03/26/2018     Imaging/EKG Studies:   CT Chest: bilateral 1st rib fractures, tiny apical PTX, soft tissue emphysema on left side  CT Ab/Pelvis: no traumatic injury   Incidental hepatic cysts   CT Head: negative  CT C-Spine: negative  XR L Shoulder: Pending  Other Studies:  None    Code Status: Level 1 - Full Code  Advance Directive and Living Will:      Power of :    POLST:

## 2018-03-26 NOTE — SOCIAL WORK
CM met with pt to discuss the role of CM  Pt lives with his family in a 2 story home which has 2STE and 18 steps inside  Pt works, drives, and was fully independent PTA  Pt owns no DME or living will  Pt has no hx of substance abuse, mental health or IP rehab  Pt's pharmacy is [facility] in [address] [place] [address]  Pt's family will transport upon d/c   Pt was made aware of the [facility] program and would like to utilize upon d/c

## 2018-03-27 ENCOUNTER — TELEPHONE (OUTPATIENT)
Dept: SURGERY | Facility: CLINIC | Age: 33
End: 2018-03-27

## 2018-03-27 VITALS
WEIGHT: 195 LBS | HEART RATE: 59 BPM | DIASTOLIC BLOOD PRESSURE: 65 MMHG | BODY MASS INDEX: 25.03 KG/M2 | SYSTOLIC BLOOD PRESSURE: 146 MMHG | TEMPERATURE: 98 F | RESPIRATION RATE: 18 BRPM | OXYGEN SATURATION: 97 % | HEIGHT: 74 IN

## 2018-03-27 DIAGNOSIS — S22.32XA TRAUMATIC CLOSED DISPLACED FRACTURE OF RIB, LEFT, INITIAL ENCOUNTER: ICD-10-CM

## 2018-03-27 LAB
ANION GAP SERPL CALCULATED.3IONS-SCNC: 6 MMOL/L (ref 4–13)
BUN SERPL-MCNC: 13 MG/DL (ref 5–25)
CALCIUM SERPL-MCNC: 8.8 MG/DL (ref 8.3–10.1)
CHLORIDE SERPL-SCNC: 105 MMOL/L (ref 100–108)
CO2 SERPL-SCNC: 27 MMOL/L (ref 21–32)
CREAT SERPL-MCNC: 0.9 MG/DL (ref 0.6–1.3)
ERYTHROCYTE [DISTWIDTH] IN BLOOD BY AUTOMATED COUNT: 12.3 % (ref 11.6–15.1)
GFR SERPL CREATININE-BSD FRML MDRD: 111 ML/MIN/1.73SQ M
GLUCOSE SERPL-MCNC: 142 MG/DL (ref 65–140)
HCT VFR BLD AUTO: 40.2 % (ref 36.5–49.3)
HGB BLD-MCNC: 14 G/DL (ref 12–17)
MCH RBC QN AUTO: 29.8 PG (ref 26.8–34.3)
MCHC RBC AUTO-ENTMCNC: 34.8 G/DL (ref 31.4–37.4)
MCV RBC AUTO: 86 FL (ref 82–98)
PLATELET # BLD AUTO: 249 THOUSANDS/UL (ref 149–390)
PMV BLD AUTO: 9.5 FL (ref 8.9–12.7)
POTASSIUM SERPL-SCNC: 4.3 MMOL/L (ref 3.5–5.3)
RBC # BLD AUTO: 4.7 MILLION/UL (ref 3.88–5.62)
SODIUM SERPL-SCNC: 138 MMOL/L (ref 136–145)
WBC # BLD AUTO: 11.24 THOUSAND/UL (ref 4.31–10.16)

## 2018-03-27 PROCEDURE — 80048 BASIC METABOLIC PNL TOTAL CA: CPT | Performed by: NURSE PRACTITIONER

## 2018-03-27 PROCEDURE — 85027 COMPLETE CBC AUTOMATED: CPT | Performed by: NURSE PRACTITIONER

## 2018-03-27 RX ORDER — OXYCODONE HYDROCHLORIDE 5 MG/1
5 TABLET ORAL EVERY 4 HOURS PRN
Qty: 30 TABLET | Refills: 0 | Status: SHIPPED | OUTPATIENT
Start: 2018-03-27 | End: 2018-03-27

## 2018-03-27 RX ORDER — OXYCODONE HYDROCHLORIDE 5 MG/1
5 TABLET ORAL EVERY 4 HOURS PRN
Qty: 20 TABLET | Refills: 0 | Status: SHIPPED | OUTPATIENT
Start: 2018-03-27 | End: 2018-04-06

## 2018-03-27 RX ORDER — METHOCARBAMOL 750 MG/1
750 TABLET, FILM COATED ORAL EVERY 6 HOURS SCHEDULED
Refills: 0 | Status: SHIPPED | OUTPATIENT
Start: 2018-03-27

## 2018-03-27 RX ORDER — OXYCODONE HYDROCHLORIDE 5 MG/1
5 TABLET ORAL EVERY 4 HOURS PRN
Qty: 20 TABLET | Refills: 0 | Status: SHIPPED | OUTPATIENT
Start: 2018-03-27 | End: 2018-03-27 | Stop reason: SDUPTHER

## 2018-03-27 RX ADMIN — LIDOCAINE 1 PATCH: 50 PATCH TOPICAL at 08:48

## 2018-03-27 RX ADMIN — ACETAMINOPHEN 975 MG: 325 TABLET, FILM COATED ORAL at 06:08

## 2018-03-27 RX ADMIN — METHOCARBAMOL 750 MG: 750 TABLET ORAL at 06:08

## 2018-03-27 NOTE — DISCHARGE SUMMARY
Discharge Summary - Sherry Quintana 28 y o  male MRN: [id number]    [facility] Encounter: [id number]    Admission Date:   Admission Orders     Ordered        03/26/18 1405  Place in Observation (expected length of stay for this patient is less than two midnights)  Once             Admitting Diagnosis: Shoulder pain [M25 519]  Pneumothorax, left [J93 9]  Right rib fracture [S22 31XA]  Traumatic closed displaced fracture of rib, left, initial encounter [S22 32XA]  Adverse effect of contrast media, initial encounter [T50 8X5A]    HPI: Sherry Quintana is a 28 y o  male who presents status post fall from tire swing yesterday complaining of left shoulder pain  Patient notes falling on his left shoulder trying to break his fall, and immediately hearing and feeling a crack  He did not hit his head or lose consciousness  He does not complain of any other pain or injuries at this time  He has no history of left shoulder pain or injury  He does note that there is some pain upon inspiration and after  Mechanism:Fall    Procedures Performed: No orders of the defined types were placed in this encounter  Hospital Course:  Patient was admitted for observation and pain control  Repeat chest x-ray showed stable small left apical pneumothorax  Patient maintained good oxygen saturation throughout hospital stay  On discharge day, patient was deemed medically stable for discharge by all members of the care team   Daily rounding was done with the nurses and daily discussion was had with the patient's family and all questions were answered      Significant Findings, Care, Treatment and Services Provided:  None    Complications:  None    Discharge Diagnosis:  Bilateral rib fractures and left apical pneumothorax    Resolved Problems  Date Reviewed: 3/27/2018    None          Condition at Discharge: stable     Discharge instructions/Information to patient and family:   See after visit summary for information provided to patient and family  Provisions for Follow-Up Care:  See after visit summary for information related to follow-up care and any pertinent home health orders  Disposition: Home    Planned Readmission: No    Discharge Statement   I spent 30 minutes discharging the patient  This time was spent on the day of discharge  I had direct contact with the patient on the day of discharge  Additional documentation is required if more than 30 minutes were spent on discharge  Discharge Medications:  See after visit summary for reconciled discharge medications provided to patient and family

## 2018-03-27 NOTE — TERTIARY TRAUMA SURVEY
Progress Note - Tertiary Trauma [place]   Mitra Evans 28 y o  male MRN: [id number]  Unit/Bed#: [facility] 908-01 Encounter: [id number]    Summary of Diagnosed Injuries:  Bilateral rib fractures with left small apical pneumothorax    Clinical Plan:   Repeat chest x-ray showed stable small left apical pneumothorax  Maintaining oxygen saturation and doing well with incentive spirometry  Good pain control  We will look to discharge later today  Mechanism of Injury: Fall    Tertiary Exam Due on: 3/27/2018    Vitals: Blood pressure 146/65, pulse 59, temperature 98 °F (36 7 °C), temperature source Oral, resp  rate 18, height 6' 2" (1 88 m), weight 88 5 kg (195 lb), SpO2 97 %  ,Body mass index is 25 04 kg/m²  CT / RADIOGRAPHS: ALL RESULTS MUST BE CONFIRMED BY FACULTY OR PRINTED REPORT    CT HEAD:  Negative CT CHEST: bilateral 1st rib fractures, tiny apical PTX, soft tissue emphysema on left side   CT FACE:  CT ABDOMEN / PELVIS: o traumatic injury  Incidental hepatic cysts    CT CERVICAL SPINE:  Negative XR PELVIS:    CT THORACIC / LUMBAR SPINE:  CXR CHEST:    OTHER:  X-ray of left shoulder shows no fracture dislocation acutely   OTHER:    OTHER:  OTHER:    OTHER: OTHER:    OTHER:  OTHER:    OTHER:  OTHER:      Consultants - List Service/ Faculty and Date:  None    Active medications:           Current Facility-Administered Medications:     acetaminophen (TYLENOL) tablet 975 mg, 975 mg, Oral, Q8H [facility], 975 mg at 03/27/18 0608    enoxaparin (LOVENOX) subcutaneous injection 40 mg, 40 mg, Subcutaneous, Q24H [facility]    lidocaine (LIDODERM) 5 % patch 1 patch, 1 patch, Transdermal, Daily, 1 patch at 03/26/18 1448    methocarbamol (ROBAXIN) tablet 750 mg, 750 mg, Oral, Q6H [facility], 750 mg at 03/27/18 0608    oxyCODONE (ROXICODONE) IR tablet 2 5 mg, 2 5 mg, Oral, Q4H PRN, 2 5 mg at 03/26/18 1455    oxyCODONE (ROXICODONE) IR tablet 5 mg, 5 mg, Oral, Q4H PRN, 5 mg at 03/26/18 2327      Intake/Output Summary (Last 24 hours) at 03/27/18 [facility] filed at 03/27/18 0600   Gross per 24 hour   Intake              480 ml   Output              480 ml   Net                0 ml       Invasive Devices     Peripheral Intravenous Line            Peripheral IV 03/26/18 Right Antecubital less than 1 day                CAGE-AID Questionnaire:    Was the patient able to participate in the CAGE-AID screening questions on admission? Yes    Is the patient 65 years or older: No    1  GCS:  GCS Total:  15  2  Head:   WNL  3  Neck:   WNL  4  Chest:   WNL  5  Abdomen/Pelvis:   WNL  6  Back (log roll with spinal immobilization unless cleared radiographically): WNL  7  Extremities:   Lacs, abrasions, swelling, ecchymosis: None   Tenderness, pain with motor, instability: Pain with PROM of L shoulder  8  Peripheral Nerves: WNL    Do NOT use the following abbreviations: DTO, gr, Sara, MS, MSO4, MgSO4, Nitro, QD, QID, QOD, u, , ?, ?g or trailing zeros   Always use a zero before a decimal     Labs:   CBC:   Lab Results   Component Value Date    WBC 11 24 (H) 03/27/2018    HGB 14 0 03/27/2018    HCT 40 2 03/27/2018    MCV 86 03/27/2018     03/27/2018    MCH 29 8 03/27/2018    MCHC 34 8 03/27/2018    RDW 12 3 03/27/2018    MPV 9 5 03/27/2018    NRBC 0 03/26/2018     CMP:   Lab Results   Component Value Date     03/27/2018     03/27/2018    CO2 27 03/27/2018    ANIONGAP 6 03/27/2018    BUN 13 03/27/2018    CREATININE 0 90 03/27/2018    GLUCOSE 142 (H) 03/27/2018    GLUCOSE 96 03/26/2018    CALCIUM 8 8 03/27/2018    EGFR 111 03/27/2018    EGFR 87 03/26/2018

## 2018-03-27 NOTE — DISCHARGE INSTRUCTIONS
Rib Fracture   WHAT YOU NEED TO KNOW:   A rib fracture is a crack or break in a rib bone  Rib fractures usually heal within 6 weeks  You should be able to return to normal activities before that time  Do not wrap anything around your body to try to splint your ribs  This can prevent you from taking deep breaths and increases your risk for pneumonia  DISCHARGE INSTRUCTIONS:   Call 911 for any of the following:   · You have trouble breathing  · You have new or increased pain  Seek care immediately if:   · Your pain does not get better, even after treatment  · You have a fever or a cough  Contact your healthcare provider if:   · You have questions or concerns about your condition or care  Medicines:   · NSAIDs  help decrease swelling and pain  NSAIDs are available without a doctor's order  Ask your healthcare provider which medicine is right for you  Ask how much to take and when to take it  Take as directed  NSAIDs can cause stomach bleeding and kidney problems if not taken correctly  · Prescription pain medicine  may be given  Ask your healthcare provider how to take this medicine safely  Some prescription pain medicines contain acetaminophen  Do not take other medicines that contain acetaminophen without talking to your healthcare provider  Too much acetaminophen may cause liver damage  Prescription pain medicine may cause constipation  Ask your healthcare provider how to prevent or treat constipation  · Take your medicine as directed  Contact your healthcare provider if you think your medicine is not helping or if you have side effects  Tell him or her if you are allergic to any medicine  Keep a list of the medicines, vitamins, and herbs you take  Include the amounts, and when and why you take them  Bring the list or the pill bottles to follow-up visits  Carry your medicine list with you in case of an emergency    Follow up with your healthcare provider as directed:  Write down your questions so you remember to ask them during your visits  Deep breathing:  Deep breathing will decrease your risk for pneumonia  Hug a pillow on the injured side while doing this exercise, to decrease pain  Take a deep breath and hold it for as long as possible  You should let the air out and then cough strongly  Deep breaths help open your airway  You may be given an incentive spirometer to help take deep breaths  Put the plastic piece in your mouth  Take a slow, deep breath  You should then let the air out and cough  Repeat these steps 10 times every hour  Rest:  Rest and limit activity to decrease swelling and pain, and allow your injury to heal  Avoid activities that may cause more pain or damage to your ribs such as, pulling, pushing, and lifting  As your pain decreases, begin movements slowly  Take short walks between rest periods  Ice:  Apply ice on the fractured area for 15 to 20 minutes every hour or as directed  Use an ice pack or put crushed ice in a plastic bag  Cover it with a towel  Ice helps prevent tissue damage and decreases swelling and pain  © 2017 [address] [facility] Information is for End User's use only and may not be sold, redistributed or otherwise used for commercial purposes  All illustrations and images included in CareNotes® are the copyrighted property of A [employer] M [employer]  or [employer]  The above information is an  only  It is not intended as medical advice for individual conditions or treatments  Talk to your doctor, nurse or pharmacist before following any medical regimen to see if it is safe and effective for you

## 2018-03-27 NOTE — ASSESSMENT & PLAN NOTE
Pain is well controlled  Patient has no problems breathing and is doing well with incentive spirometry  Maintaining good oxygen saturation  Continue incentive spirometry and aggressive pulmonary toilet

## 2018-03-27 NOTE — CASE MANAGEMENT
Initial Clinical Review    Admission: Date/Time/Statement: Observation 3/26 @ 1428    Orders Placed This Encounter   Procedures    Place in Observation (expected length of stay for this patient is less than two midnights)     Standing Status:   Standing     Number of Occurrences:   1     Order Specific Question:   Admitting Physician     Answer:   Bryan Query     Order Specific Question:   Level of Care     Answer:   Med Surg [16]       ED: Date/Time/Mode of Arrival:   ED Arrival Information     Expected Arrival Acuity Means of Arrival Escorted By Service Admission Type    - 3/26/2018 10:07 Less Urgent Walk-In Self Trauma Urgent    Arrival Complaint    shoulder pain          Chief Complaint:   Chief Complaint   Patient presents with    Shoulder Injury      [place] with kids yesterday and hurt left shoulder  Decreased movement       History of Illness:  28 y o  male who presents status post fall from tire swing yesterday complaining of left shoulder pain  Patient notes falling on his left shoulder trying to break his fall, and immediately hearing and feeling a crack  He did not hit his head or lose consciousness  He does not complain of any other pain or injuries at this time  He has no history of left shoulder pain or injury  He does note that there is some pain upon inspiration and after  ED Vital Signs:   ED Triage Vitals   Temperature Pulse Respirations Blood Pressure SpO2   03/26/18 1014 03/26/18 1014 03/26/18 1014 03/26/18 1014 03/26/18 1014   97 8 °F (36 6 °C) 64 20 135/75 100 %      Temp Source Heart Rate Source Patient Position - Orthostatic VS BP Location FiO2 (%)   03/26/18 1014 03/26/18 1014 03/26/18 1525 03/26/18 1525 --   Tympanic Monitor Sitting Right arm       Pain Score       03/26/18 1014       5        Wt Readings from Last 1 Encounters:   03/26/18 88 5 kg (195 lb)     O2 RA    Vital Signs (abnormal):  WNL    Abnormal Labs: Wbc = 11 24    Diagnostic Test Results: CT Chest - IMPRESSION:  Bilateral left 1st rib fractures with associated soft tissue emphysema on the left suggesting penetrating trauma  Tiny left apical pneumothorax best appreciated  No evidence of right-sided pneumothorax  CT Head - No acute intracranial abnormality  ED Treatment:   Medication Administration from 03/26/2018 1007 to 03/26/2018 1531       Date/Time Order Dose Route Action     03/26/2018 1201 sodium chloride 0 9 % bolus 1,000 mL 1,000 mL Intravenous New Bag     03/26/2018 1207 ondansetron (ZOFRAN) injection 4 mg 4 mg Intravenous Given     03/26/2018 1206 morphine injection 2 mg 2 mg Intravenous Given     03/26/2018 1308 iohexol (OMNIPAQUE) 350 MG/ML injection (MULTI-DOSE) 100 mL 100 mL Intravenous Given     03/26/2018 1444 acetaminophen (TYLENOL) tablet 975 mg 975 mg Oral Given     03/26/2018 1444 methocarbamol (ROBAXIN) tablet 500 mg 500 mg Oral Given     03/26/2018 1448 lidocaine (LIDODERM) 5 % patch 1 patch 1 patch Transdermal Medication Applied     03/26/2018 1446 diphenhydrAMINE (BENADRYL) injection 25 mg 25 mg Intravenous Given     03/26/2018 1445 oxyCODONE (ROXICODONE) IR tablet 5 mg 5 mg Oral Given     03/26/2018 1455 oxyCODONE (ROXICODONE) IR tablet 2 5 mg 2 5 mg Oral Given     03/26/2018 1442 methylPREDNISolone sodium succinate (Solu-MEDROL) injection 125 mg 125 mg Intravenous Given     03/26/2018 1445 famotidine (PEPCID) tablet 20 mg 20 mg Oral Given          Past Medical/Surgical History: Active Ambulatory Problems     Diagnosis Date Noted    No Active Ambulatory Problems     Resolved Ambulatory Problems     Diagnosis Date Noted    No Resolved Ambulatory Problems     No Additional Past Medical History       Admitting Diagnosis: Shoulder pain [M25 519]  Pneumothorax, left [J93 9]  Right rib fracture [S22 31XA]  Traumatic closed displaced fracture of rib, left, initial encounter [S22 32XA]  Adverse effect of contrast media, initial encounter [T50 8X5A]    Age/Sex: 28 y  o  male    Assessment/Plan:   Trauma Alert: Evaluation  Model of Arrival: Helicopter    Trauma Active Problems:  Bilateral rib fractures and a left apical pneumothorax     Trauma Plan:   Pain control  Will consult APS if necessary  Incentive spirometry and aggressive pulmonary toilet  Repeat chest x-ray in 6 hours  Continuous oxygen saturation monitoring        Admission Orders:  Neurological checks qshift    Scheduled Meds:   Current Facility-Administered Medications:  acetaminophen 975 mg Oral Q8H JUSTINE   enoxaparin 40 mg Subcutaneous Q24H JUSTINE   lidocaine 1 patch Transdermal Daily   methocarbamol 750 mg Oral Q6H [facility]     Continuous Infusions:    PRN Meds:    oxyCODONE po x2

## 2018-03-27 NOTE — NURSING NOTE
Reviewed discharge instructions re: medications, diet, activity limits and follow up appointments to be kept with patient  Patient indicates understanding of same

## 2018-03-27 NOTE — TELEPHONE ENCOUNTER
Patient called and is stating that [facility] pharmacy did not receive the oxyCODONE Script   Please send again

## 2018-04-02 NOTE — PROGRESS NOTES
Left apical pneumothorax caused by same mechanism of action of rib fractures, as all injuries occurred together most likley

## 2018-04-12 ENCOUNTER — OFFICE VISIT (OUTPATIENT)
Dept: SURGERY | Facility: CLINIC | Age: 33
End: 2018-04-12
Payer: COMMERCIAL

## 2018-04-12 VITALS
DIASTOLIC BLOOD PRESSURE: 78 MMHG | HEIGHT: 74 IN | TEMPERATURE: 97.2 F | BODY MASS INDEX: 24.67 KG/M2 | WEIGHT: 192.2 LBS | SYSTOLIC BLOOD PRESSURE: 124 MMHG

## 2018-04-12 DIAGNOSIS — S22.32XD CLOSED FRACTURE OF ONE RIB OF LEFT SIDE WITH ROUTINE HEALING, SUBSEQUENT ENCOUNTER: Primary | ICD-10-CM

## 2018-04-12 PROBLEM — S27.0XXA TRAUMATIC PNEUMOTHORAX: Status: RESOLVED | Noted: 2018-03-26 | Resolved: 2018-04-12

## 2018-04-12 PROCEDURE — 99212 OFFICE O/P EST SF 10 MIN: CPT | Performed by: SURGERY

## 2018-04-12 NOTE — PROGRESS NOTES
Office Visit - Trauma  Russel Stokes MRN: [id number]  Encounter: [id number]    Assessment and Plan    Problem List Items Addressed This Visit     Closed fracture of one rib of left side - Primary     - Pain controlled  - Ambulatory without any difficulty  - Cleared to return to work and driving  - No further imaging indicated  - No further scripts required  - Pt and Ot not indicated in his case  - Return to ER with any worsening chest pain or shortness of breath             Disposition: Discharge from trauma service  Chief Complaint:  Russel Stokes is a 28 y o  male who presents for Follow-up (rib fracture)    Subjective  Patient reports no new complaints today on presentation  Denies any new chest pain or shortness of breath  Denies any dyspnea on exertion  Reports his pain is all but gone  Reports that he is driving without difficulty  Denies any cough or congestion  Continues to use his incentive spirometry  Past Medical History  History reviewed  No pertinent past medical history  Past Surgical History  History reviewed  No pertinent surgical history  Family History  Family History   Problem Relation Age of Onset    Colon cancer Father     Diabetes Brother        Medications  Current Outpatient Prescriptions on File Prior to Visit   Medication Sig Dispense Refill    methocarbamol (ROBAXIN) 750 mg tablet Take 1 tablet (750 mg total) by mouth every 6 (six) hours  0     No current facility-administered medications on file prior to visit  Allergies  No Known Allergies    Review of Systems   Constitutional: Negative for activity change, appetite change and fever  HENT: Negative for ear discharge, ear pain, rhinorrhea, sore throat and trouble swallowing  Eyes: Negative for photophobia, pain and redness  Respiratory: Negative for apnea, cough, chest tightness, shortness of breath and stridor  Cardiovascular: Negative for chest pain and palpitations     Gastrointestinal: Negative for abdominal distention, abdominal pain, nausea and vomiting  Endocrine: Negative for cold intolerance and heat intolerance  Genitourinary: Negative  Musculoskeletal: Negative for arthralgias, back pain, neck pain and neck stiffness  Skin: Negative  Neurological: Negative for dizziness, weakness, light-headedness and numbness  Hematological: Negative  Objective  Vitals:    04/12/18 1357   BP: 124/78   Temp: (!) 97 2 °F (36 2 °C)       Physical Exam   Constitutional: He is oriented to person, place, and time  He appears well-developed and well-nourished  HENT:   Head: Normocephalic  Right Ear: External ear normal    Left Ear: External ear normal    Eyes: Conjunctivae are normal  Pupils are equal, round, and reactive to light  Right eye exhibits no discharge  Left eye exhibits no discharge  Neck: Normal range of motion  Neck supple  No tracheal deviation present  Cardiovascular: Normal rate, regular rhythm, normal heart sounds and intact distal pulses  Exam reveals no gallop and no friction rub  No murmur heard  Pulmonary/Chest: Effort normal and breath sounds normal  No respiratory distress  He has no wheezes  Abdominal: Soft  Bowel sounds are normal  He exhibits no distension  There is no tenderness  Musculoskeletal: Normal range of motion  He exhibits no edema or deformity  Neurological: He is alert and oriented to person, place, and time  No cranial nerve deficit  Skin: Skin is warm and dry  He is not diaphoretic  No erythema

## 2018-04-12 NOTE — ASSESSMENT & PLAN NOTE
- Pain controlled  - Ambulatory without any difficulty  - Cleared to return to work and driving  - No further imaging indicated  - No further scripts required  - Pt and Ot not indicated in his case  - Return to ER with any worsening chest pain or shortness of breath

## 2020-01-10 ENCOUNTER — TELEPHONE (OUTPATIENT)
Dept: FAMILY MEDICINE CLINIC | Facility: CLINIC | Age: 35
End: 2020-01-10

## 2020-02-11 ENCOUNTER — TELEPHONE (OUTPATIENT)
Dept: FAMILY MEDICINE CLINIC | Facility: CLINIC | Age: 35
End: 2020-02-11

## 2020-03-03 ENCOUNTER — TELEPHONE (OUTPATIENT)
Dept: FAMILY MEDICINE CLINIC | Facility: CLINIC | Age: 35
End: 2020-03-03

## 2020-03-17 ENCOUNTER — TELEPHONE (OUTPATIENT)
Dept: FAMILY MEDICINE CLINIC | Facility: CLINIC | Age: 35
End: 2020-03-17

## 2023-09-05 ENCOUNTER — TELEPHONE (OUTPATIENT)
Age: 38
End: 2023-09-05

## 2023-09-05 NOTE — TELEPHONE ENCOUNTER
Patient called that he had called [facility] his insurance regarding the referral. They stated the gastro office can go on the website and submit a referral. Patient wants to know if the office would do this for him before his appointment. Please give him a call back if this is possible or not.

## 2023-09-12 NOTE — TELEPHONE ENCOUNTER
Appointment was cancelled by the patient, we did not receive authorization from [address] [place] [address].